# Patient Record
Sex: MALE | Race: WHITE | NOT HISPANIC OR LATINO | Employment: UNEMPLOYED | URBAN - METROPOLITAN AREA
[De-identification: names, ages, dates, MRNs, and addresses within clinical notes are randomized per-mention and may not be internally consistent; named-entity substitution may affect disease eponyms.]

---

## 2017-05-01 ENCOUNTER — HOSPITAL ENCOUNTER (EMERGENCY)
Facility: HOSPITAL | Age: 27
Discharge: LEFT BEFORE DISCHARGE | End: 2017-05-01
Attending: EMERGENCY MEDICINE
Payer: OTHER GOVERNMENT

## 2017-05-01 VITALS
OXYGEN SATURATION: 98 % | HEIGHT: 76 IN | HEART RATE: 100 BPM | TEMPERATURE: 100 F | WEIGHT: 180 LBS | BODY MASS INDEX: 21.92 KG/M2 | DIASTOLIC BLOOD PRESSURE: 69 MMHG | SYSTOLIC BLOOD PRESSURE: 125 MMHG | RESPIRATION RATE: 15 BRPM

## 2017-05-01 DIAGNOSIS — F14.10 COCAINE ABUSE: Primary | ICD-10-CM

## 2017-05-01 DIAGNOSIS — R10.9 FLANK PAIN, ACUTE: ICD-10-CM

## 2017-05-01 DIAGNOSIS — R50.9 FEVER: ICD-10-CM

## 2017-05-01 LAB
ALBUMIN SERPL BCP-MCNC: 4.1 G/DL
ALP SERPL-CCNC: 67 U/L
ALT SERPL W/O P-5'-P-CCNC: 12 U/L
AMPHET+METHAMPHET UR QL: NEGATIVE
ANION GAP SERPL CALC-SCNC: 11 MMOL/L
AST SERPL-CCNC: 20 U/L
BARBITURATES UR QL SCN>200 NG/ML: NEGATIVE
BASOPHILS # BLD AUTO: 0.01 K/UL
BASOPHILS NFR BLD: 0.1 %
BENZODIAZ UR QL SCN>200 NG/ML: NEGATIVE
BILIRUB SERPL-MCNC: 0.5 MG/DL
BILIRUB UR QL STRIP: NEGATIVE
BUN SERPL-MCNC: 7 MG/DL
BZE UR QL SCN: NORMAL
CALCIUM SERPL-MCNC: 8.8 MG/DL
CANNABINOIDS UR QL SCN: NORMAL
CHLORIDE SERPL-SCNC: 103 MMOL/L
CK SERPL-CCNC: 133 U/L
CLARITY UR: CLEAR
CO2 SERPL-SCNC: 25 MMOL/L
COLOR UR: YELLOW
CREAT SERPL-MCNC: 1 MG/DL
CREAT UR-MCNC: 69.7 MG/DL
DIFFERENTIAL METHOD: ABNORMAL
EOSINOPHIL # BLD AUTO: 0.1 K/UL
EOSINOPHIL NFR BLD: 0.8 %
ERYTHROCYTE [DISTWIDTH] IN BLOOD BY AUTOMATED COUNT: 13.1 %
EST. GFR  (AFRICAN AMERICAN): >60 ML/MIN/1.73 M^2
EST. GFR  (NON AFRICAN AMERICAN): >60 ML/MIN/1.73 M^2
GLUCOSE SERPL-MCNC: 51 MG/DL
GLUCOSE UR QL STRIP: NEGATIVE
HCT VFR BLD AUTO: 42.1 %
HGB BLD-MCNC: 15 G/DL
HGB UR QL STRIP: NEGATIVE
KETONES UR QL STRIP: NEGATIVE
LEUKOCYTE ESTERASE UR QL STRIP: NEGATIVE
LYMPHOCYTES # BLD AUTO: 1.1 K/UL
LYMPHOCYTES NFR BLD: 11 %
MCH RBC QN AUTO: 31 PG
MCHC RBC AUTO-ENTMCNC: 35.6 %
MCV RBC AUTO: 87 FL
METHADONE UR QL SCN>300 NG/ML: NEGATIVE
MONOCYTES # BLD AUTO: 0.1 K/UL
MONOCYTES NFR BLD: 0.7 %
NEUTROPHILS # BLD AUTO: 8.5 K/UL
NEUTROPHILS NFR BLD: 87.4 %
NITRITE UR QL STRIP: NEGATIVE
OPIATES UR QL SCN: NORMAL
PCP UR QL SCN>25 NG/ML: NEGATIVE
PH UR STRIP: 6 [PH] (ref 5–8)
PLATELET # BLD AUTO: 174 K/UL
PMV BLD AUTO: 9.4 FL
POTASSIUM SERPL-SCNC: 3.7 MMOL/L
PROT SERPL-MCNC: 7.2 G/DL
PROT UR QL STRIP: NEGATIVE
RBC # BLD AUTO: 4.84 M/UL
SODIUM SERPL-SCNC: 139 MMOL/L
SP GR UR STRIP: 1.01 (ref 1–1.03)
TOXICOLOGY INFORMATION: NORMAL
URN SPEC COLLECT METH UR: NORMAL
UROBILINOGEN UR STRIP-ACNC: NEGATIVE EU/DL
WBC # BLD AUTO: 9.73 K/UL

## 2017-05-01 PROCEDURE — 80307 DRUG TEST PRSMV CHEM ANLYZR: CPT

## 2017-05-01 PROCEDURE — 99285 EMERGENCY DEPT VISIT HI MDM: CPT | Mod: 25

## 2017-05-01 PROCEDURE — 25000003 PHARM REV CODE 250: Performed by: EMERGENCY MEDICINE

## 2017-05-01 PROCEDURE — 82570 ASSAY OF URINE CREATININE: CPT

## 2017-05-01 PROCEDURE — 81003 URINALYSIS AUTO W/O SCOPE: CPT

## 2017-05-01 PROCEDURE — 80053 COMPREHEN METABOLIC PANEL: CPT

## 2017-05-01 PROCEDURE — 82550 ASSAY OF CK (CPK): CPT

## 2017-05-01 PROCEDURE — 96360 HYDRATION IV INFUSION INIT: CPT

## 2017-05-01 PROCEDURE — 85025 COMPLETE CBC W/AUTO DIFF WBC: CPT

## 2017-05-01 RX ORDER — CLONIDINE HYDROCHLORIDE 0.1 MG/1
0.1 TABLET ORAL 2 TIMES DAILY
COMMUNITY

## 2017-05-01 RX ORDER — ACETAMINOPHEN 325 MG/1
650 TABLET ORAL
Status: COMPLETED | OUTPATIENT
Start: 2017-05-01 | End: 2017-05-01

## 2017-05-01 RX ORDER — HYDROCODONE BITARTRATE AND ACETAMINOPHEN 10; 325 MG/1; MG/1
TABLET ORAL
COMMUNITY

## 2017-05-01 RX ORDER — SULFAMETHOXAZOLE AND TRIMETHOPRIM 400; 80 MG/1; MG/1
1 TABLET ORAL
COMMUNITY

## 2017-05-01 RX ORDER — RIZATRIPTAN BENZOATE 10 MG/1
10 TABLET ORAL
COMMUNITY

## 2017-05-01 RX ORDER — CYCLOBENZAPRINE HCL 10 MG
10 TABLET ORAL 3 TIMES DAILY PRN
COMMUNITY

## 2017-05-01 RX ADMIN — ACETAMINOPHEN 650 MG: 325 TABLET ORAL at 08:05

## 2017-05-01 RX ADMIN — SODIUM CHLORIDE 1000 ML: 0.9 INJECTION, SOLUTION INTRAVENOUS at 07:05

## 2017-05-02 NOTE — ED NOTES
"Patient resting in bed with girlfriend and child at bedside.  Vitals are stable.  No distress is noted by the patient.  Patient is slow to respond and has slurred speech.  Both patient and girlfriend states this is "normal" for the patient and that this is "as good as it gets".  Bed in low, locked, position with side rails up x 2 and call light within reach.  "

## 2017-05-02 NOTE — ED PROVIDER NOTES
"SCRIBE #1 NOTE: I, Rosalinda Leong, am scribing for, and in the presence of, Ramonita Espinoza MD. I have scribed the - HPI, ROS, and PEx.    SCRIBE #2 NOTE: I, Kourtney Maddox, am scribing for, and in the presence of,  Titus Carpio MD. I have scribed the remaining portions of the note not scribed by Scribe #1.     History      Chief Complaint   Patient presents with    Shaking     Pt states he was driving and then his body tightened and he began shaking and having bad kidney pain       Review of patient's allergies indicates:  No Known Allergies     HPI   HPI    5/1/2017, 7:42 PM   History obtained from the patient      History of Present Illness: Ganesh Masters is a 26 y.o. male patient who presents to the Emergency Department for bilateral flank pain which onset suddenly PTA. Sx have been constant and moderate in severity. Pt states that sx have improved since receiving IV fluids in ED. No exacerbating factors noted. Associated sx include myalgias, fever, and "shaking." Pt states, "I was driving when my body started suddenly locking up and my muscles tightened.  I was shaking uncontrollably and then my kidneys started hurting." Pt denies any chills, CP, SOB, abdominal pain, n/v/d, dysuria, hematuria, dizziness, seizures, or weakness/numbness. No further complaints at this time.       Arrival mode: EMS    PCP: No primary care provider on file.       Past Medical History:  Past Medical History:   Diagnosis Date    Anxiety     Chronic pain     "all over"    Depression     PTSD (post-traumatic stress disorder)     "from serving [place]"    Traumatic brain injury     "walters injury"       Past Surgical History:  Reviewed. Not pertinent       Family History:  Reviewed. Not pertinent     Social History:  Social History     Social History Main Topics    Smoking status: Current Every Day Smoker     Packs/day: 1.00    Smokeless tobacco: Unknown     Alcohol use Yes      Comment: "occasional"    Drug " "use: Yes     Special: Marijuana, Hydrocodone, Oxycodone, "Crack" cocaine    Sexual activity: Yes     Partners: Female       ROS   Review of Systems   Constitutional: Positive for fever. Negative for chills and diaphoresis.   HENT: Negative for sore throat.    Eyes: Negative for visual disturbance.   Respiratory: Negative for shortness of breath.    Cardiovascular: Negative for chest pain.   Gastrointestinal: Negative for abdominal pain, blood in stool, constipation, diarrhea, nausea and vomiting.   Genitourinary: Positive for flank pain (bilateral). Negative for dysuria and hematuria.   Musculoskeletal: Positive for myalgias. Negative for back pain, neck pain and neck stiffness.        (+) "shaking"   Skin: Negative for rash.   Neurological: Negative for dizziness, seizures, speech difficulty, weakness, light-headedness, numbness and headaches.   Hematological: Does not bruise/bleed easily.       Physical Exam    Initial Vitals   BP Pulse Resp Temp SpO2   05/01/17 1820 05/01/17 1820 05/01/17 1820 05/01/17 1820 05/01/17 1820   182/92 131 18 100.2 °F (37.9 °C) 95 %      Physical Exam  Nursing Notes and Vital Signs Reviewed.  Constitutional: Patient is in no acute distress. Awake and alert. Well-developed and well-nourished.  Head: Atraumatic. Normocephalic.  Eyes: PERRL. EOM intact. Conjunctivae are not pale. No scleral icterus.  ENT: Mucous membranes are moist. Oropharynx is clear and symmetric.    Neck: Supple. Full ROM. No lymphadenopathy.  Cardiovascular: Tachycardic. Regular rhythm. No murmurs, rubs, or gallops. Distal pulses are 2+ and symmetric.  Pulmonary/Chest: No respiratory distress. Clear to auscultation bilaterally. No wheezing, rales, or rhonchi.  Abdominal: Soft and non-distended.  There is no tenderness.  No rebound, guarding, or rigidity.   Genitourinary: Bilateral CVA tenderness  Musculoskeletal: Moves all extremities. No obvious deformities. No edema. No calf tenderness.  Skin: Warm and " "dry.  Neurological:  Alert, awake, and appropriate.  Normal speech.  No acute focal neurological deficits are appreciated.  Psychiatric: Anxious affect. Good eye contact. Appropriate in content.    ED Course    Procedures  ED Vital Signs:  Vitals:    05/01/17 1820 05/01/17 1848 05/01/17 1849 05/01/17 1947   BP: (!) 182/92  (!) 143/77 116/67   Pulse: (!) 131 (!) 125 (!) 125 102   Resp: 18  15 12   Temp: 100.2 °F (37.9 °C)      TempSrc: Tympanic      SpO2: 95%  96% 99%   Weight: 81.6 kg (180 lb)      Height: 6' 4" (1.93 m)       05/01/17 1959 05/01/17 2000 05/01/17 2002 05/01/17 2018   BP:   118/65 125/69   Pulse: 99  101 100   Resp: 17  16 15   Temp:  (!) 100.4 °F (38 °C)     TempSrc:  Axillary     SpO2: 98%  98% 98%   Weight:       Height:           Abnormal Lab Results:  Labs Reviewed   CBC W/ AUTO DIFFERENTIAL - Abnormal; Notable for the following:        Result Value    Gran # 8.5 (*)     Mono # 0.1 (*)     Gran% 87.4 (*)     Lymph% 11.0 (*)     Mono% 0.7 (*)     All other components within normal limits   COMPREHENSIVE METABOLIC PANEL - Abnormal; Notable for the following:     Glucose 51 (*)     All other components within normal limits   URINALYSIS   DRUG SCREEN PANEL, URINE EMERGENCY   CK   CK        All Lab Results:  Results for orders placed or performed during the hospital encounter of 05/01/17   Urinalysis Clean Catch   Result Value Ref Range    Specimen UA Urine, Clean Catch     Color, UA Yellow Yellow, Straw, Leena    Appearance, UA Clear Clear    pH, UA 6.0 5.0 - 8.0    Specific Gravity, UA 1.010 1.005 - 1.030    Protein, UA Negative Negative    Glucose, UA Negative Negative    Ketones, UA Negative Negative    Bilirubin (UA) Negative Negative    Occult Blood UA Negative Negative    Nitrite, UA Negative Negative    Urobilinogen, UA Negative <2.0 EU/dL    Leukocytes, UA Negative Negative   Drug screen panel, emergency   Result Value Ref Range    Benzodiazepines Negative     Methadone metabolites Negative  "    Cocaine (Metab.) Presumptive Positive     Opiate Scrn, Ur Presumptive Positive     Barbiturate Screen, Ur Negative     Amphetamine Screen, Ur Negative     THC Presumptive Positive     Phencyclidine Negative     Creatinine, Random Ur 69.7 23.0 - 375.0 mg/dL    Toxicology Information SEE COMMENT    CBC auto differential   Result Value Ref Range    WBC 9.73 3.90 - 12.70 K/uL    RBC 4.84 4.60 - 6.20 M/uL    Hemoglobin 15.0 14.0 - 18.0 g/dL    Hematocrit 42.1 40.0 - 54.0 %    MCV 87 82 - 98 fL    MCH 31.0 27.0 - 31.0 pg    MCHC 35.6 32.0 - 36.0 %    RDW 13.1 11.5 - 14.5 %    Platelets 174 150 - 350 K/uL    MPV 9.4 9.2 - 12.9 fL    Gran # 8.5 (H) 1.8 - 7.7 K/uL    Lymph # 1.1 1.0 - 4.8 K/uL    Mono # 0.1 (L) 0.3 - 1.0 K/uL    Eos # 0.1 0.0 - 0.5 K/uL    Baso # 0.01 0.00 - 0.20 K/uL    Gran% 87.4 (H) 38.0 - 73.0 %    Lymph% 11.0 (L) 18.0 - 48.0 %    Mono% 0.7 (L) 4.0 - 15.0 %    Eosinophil% 0.8 0.0 - 8.0 %    Basophil% 0.1 0.0 - 1.9 %    Differential Method Automated    Comprehensive metabolic panel   Result Value Ref Range    Sodium 139 136 - 145 mmol/L    Potassium 3.7 3.5 - 5.1 mmol/L    Chloride 103 95 - 110 mmol/L    CO2 25 23 - 29 mmol/L    Glucose 51 (L) 70 - 110 mg/dL    BUN, Bld 7 6 - 20 mg/dL    Creatinine 1.0 0.5 - 1.4 mg/dL    Calcium 8.8 8.7 - 10.5 mg/dL    Total Protein 7.2 6.0 - 8.4 g/dL    Albumin 4.1 3.5 - 5.2 g/dL    Total Bilirubin 0.5 0.1 - 1.0 mg/dL    Alkaline Phosphatase 67 55 - 135 U/L    AST 20 10 - 40 U/L    ALT 12 10 - 44 U/L    Anion Gap 11 8 - 16 mmol/L    eGFR if African American >60 >60 mL/min/1.73 m^2    eGFR if non African American >60 >60 mL/min/1.73 m^2   CK   Result Value Ref Range     20 - 200 U/L         Imaging Results:  Imaging Results     None                  The Emergency Provider reviewed the vital signs and test results, which are outlined above.    ED Discussion     8:00 PM: Dr. Espinoza transfers care of pt to Dr. Carpio, pending lab results.    8:10 PM: Pt is A&O  x3, appropriate and competent at this time. Pt voices desire to leave hospital. D/w pt in length need for further evaluation and treatment due to HPI and PEx. Pt declines any further evaluation or tx at this time. All risks, including worsening sx, permanent bodily harm and death, were discussed in length. Pt acknowledges all risk at this time and agrees to sign AMA form. Pt given RTER instructions. All questions and concerns addressed at this time. Pt leaving AMA.         ED Medication(s):  Medications   sodium chloride 0.9% bolus 1,000 mL (0 mLs Intravenous Stopped 5/1/17 2003)   acetaminophen tablet 650 mg (650 mg Oral Given 5/1/17 2003)       Discharge Medication List as of 5/1/2017  8:29 PM                Medical Decision Making    Medical Decision Making:   Clinical Tests:   Lab Tests: Ordered and Reviewed           Scribe Attestation:   Scribe #1: I performed the above scribed service and the documentation accurately describes the services I performed. I attest to the accuracy of the note.    Attending:   Physician Attestation Statement for Scribe #1: I, Ramonita Espinoza MD, personally performed the services described in this documentation, as scribed by Rosalinda Leong, in my presence, and it is both accurate and complete.       Scribe Attestation:   Scribe #2: I performed the above scribed service and the documentation accurately describes the services I performed. I attest to the accuracy of the note.    Attending Attestation:           Physician Attestation for Scribe:    Physician Attestation Statement for Scribe #2: I, Titus Carpio MD, reviewed documentation, as scribed by Kourtney Maddox in my presence, and it is both accurate and complete. I also acknowledge and confirm the content of the note done by Addieibe #1.          Clinical Impression       ICD-10-CM ICD-9-CM   1. Cocaine abuse F14.10 305.60   2. Fever R50.9 780.60   3. Flank pain, acute R10.9 789.09     338.19       Disposition:    Disposition: AMA  Condition: Fair         Titus Carpio MD  05/01/17 0031

## 2017-05-02 NOTE — ED NOTES
Patient refusing to have CT scan or x-ray done.  Patient states he is ready to go home now.  Dr. Carpio has been to the bedside to speak with patient.  Risks and benefits of treatment discussed.  Patient maintains that he is leaving.  AMA form has been signed by both physician and the patient.  Vitals taken and IV removed.  Patient leaving with his girlfriend at this time.